# Patient Record
Sex: FEMALE | Employment: PART TIME | URBAN - METROPOLITAN AREA
[De-identification: names, ages, dates, MRNs, and addresses within clinical notes are randomized per-mention and may not be internally consistent; named-entity substitution may affect disease eponyms.]

---

## 2024-09-05 ENCOUNTER — TRANSCRIBE ORDERS (OUTPATIENT)
Dept: ORTHOPEDIC SURGERY | Facility: HOSPITAL | Age: 34
End: 2024-09-05
Payer: COMMERCIAL

## 2024-09-05 DIAGNOSIS — M54.50 LOW BACK PAIN, UNSPECIFIED BACK PAIN LATERALITY, UNSPECIFIED CHRONICITY, UNSPECIFIED WHETHER SCIATICA PRESENT: ICD-10-CM

## 2024-09-06 ENCOUNTER — APPOINTMENT (OUTPATIENT)
Dept: ORTHOPEDIC SURGERY | Facility: CLINIC | Age: 34
End: 2024-09-06
Payer: COMMERCIAL

## 2024-09-06 ENCOUNTER — APPOINTMENT (OUTPATIENT)
Dept: RADIOLOGY | Facility: CLINIC | Age: 34
End: 2024-09-06
Payer: COMMERCIAL

## 2024-09-06 ENCOUNTER — HOSPITAL ENCOUNTER (OUTPATIENT)
Dept: RADIOLOGY | Facility: CLINIC | Age: 34
Discharge: HOME | End: 2024-09-06
Payer: COMMERCIAL

## 2024-09-06 VITALS — BODY MASS INDEX: 33.99 KG/M2 | HEIGHT: 61 IN | WEIGHT: 180 LBS

## 2024-09-06 DIAGNOSIS — M51.36 DEGENERATIVE DISC DISEASE, LUMBAR: ICD-10-CM

## 2024-09-06 DIAGNOSIS — M54.50 LOW BACK PAIN, UNSPECIFIED BACK PAIN LATERALITY, UNSPECIFIED CHRONICITY, UNSPECIFIED WHETHER SCIATICA PRESENT: ICD-10-CM

## 2024-09-06 PROCEDURE — 72110 X-RAY EXAM L-2 SPINE 4/>VWS: CPT

## 2024-09-06 PROCEDURE — 3008F BODY MASS INDEX DOCD: CPT | Performed by: PHYSICIAN ASSISTANT

## 2024-09-06 PROCEDURE — 99203 OFFICE O/P NEW LOW 30 MIN: CPT | Performed by: PHYSICIAN ASSISTANT

## 2024-09-06 RX ORDER — MELOXICAM 7.5 MG/1
7.5 TABLET ORAL DAILY
Qty: 90 TABLET | Refills: 2 | Status: SHIPPED | OUTPATIENT
Start: 2024-09-06

## 2024-09-06 ASSESSMENT — PAIN - FUNCTIONAL ASSESSMENT: PAIN_FUNCTIONAL_ASSESSMENT: 0-10

## 2024-09-06 NOTE — PROGRESS NOTES
HPI:  Ethel Cueva is a 34 y.o. female who presents to the spine clinic for evaluation of chronic low back pain.     Reports midline low back pain with intermittent radiation into the lower extremities. Symptoms ongoing for several years however flare ups have become more frequent and longer lasting.  Pain radiates into bilateral buttocks/hips with radiation below the knees at times. Pain increased with sitting, standing, laying down, and bending/lifting/twisting.     She has previously undergone both chiropractoic treatment and physical therapy, but has not been able to keep up with the exercises at home.     She is not taking anything for pain at this time.     Of note, she is a current smoker.     ROS:  Reviewed on EMR and patient intake sheet.    PMH/SH:  Reviewed on EMR and patient intake sheet.    Exam:  Physical Exam  Spine Musculoskeletal Exam    Well appearing, NAD  Pleasant & cooperative  BMI 34.01  Decreased ROM lumbar spine with rotation, flexion/extension  No paraspinal muscle spasms  lower extremity sensation intact to light touch  lower extremity motor 5/5 throughout  normal gait & station    Radiology:     Xrays lumbar spine done in the office today 09/06/24 personally reviewed. No scoliosis, fractures, or spondylolisthesis. Moderate disc degeneration L4-5. No abnormal motion with flex/ext      Assessment and Plan:  1. Degenerative disc disease, lumbar  - Referral to Physical Therapy; Future  - meloxicam (Mobic) 7.5 mg tablet; Take 1 tablet (7.5 mg) by mouth once daily.  Dispense: 90 tablet; Refill: 2    I reviewed the imaging studies with Ethel Cueva in detail today. Patient with low back pain without claudication or radiculopathy. We discussed the pathology and natural course of degenerative disc disease. I educated the patient on several lifestyle modifications that include increased walking, weight management, smoking cessation, and a routine exercise plan that includes core strengthening. The  patient was educated that this pain may fluctuate over time. I recommended a trial of Meloxicam for pain relief.  Referral placed for physical therapy to focus on core strengthening.       Patient in agreement to plan of care. Of course Ethel Boyceta is welcome to call at anytime with questions or concerns.     Priya Oliveira PA-C  Department of Orthopaedic Surgery    [address] [place] [address]    Voicemail: [phone]   Appts: [phone]  Fax: [phone]

## 2024-09-16 PROBLEM — F41.9 ANXIETY: Status: ACTIVE | Noted: 2020-12-24

## 2024-09-16 PROBLEM — G43.919 INTRACTABLE MIGRAINE WITHOUT STATUS MIGRAINOSUS: Status: ACTIVE | Noted: 2024-09-16

## 2024-09-16 PROBLEM — M54.31 SCIATICA OF RIGHT SIDE: Status: RESOLVED | Noted: 2024-09-16 | Resolved: 2024-09-16

## 2024-09-16 RX ORDER — VENLAFAXINE HYDROCHLORIDE 75 MG/1
1 CAPSULE, EXTENDED RELEASE ORAL DAILY
COMMUNITY
Start: 2020-06-01 | End: 2024-09-17 | Stop reason: WASHOUT

## 2024-09-16 RX ORDER — GABAPENTIN 100 MG/1
2 CAPSULE ORAL 3 TIMES DAILY
COMMUNITY
Start: 2020-06-25 | End: 2024-09-17 | Stop reason: WASHOUT

## 2024-09-16 RX ORDER — TOPIRAMATE 25 MG/1
2 TABLET ORAL 2 TIMES DAILY
COMMUNITY
Start: 2020-06-22 | End: 2024-09-17 | Stop reason: WASHOUT

## 2024-09-16 NOTE — PROGRESS NOTES
Subjective     HPI   Ethel Cuvea is a 34 y.o. year old female patient with presenting to clinic with concern for   Chief Complaint   Patient presents with    New Patient Visit     Was an anxiety medication would like to restart. Was Effexor would like to discuss something else.        Ethel presents to clinic to establish as a new patient. Previously seen by Roxana Vernon PA-C at [facility] in 2021.     Anxiety Effexor 75mg will restart on 37.5mg. Had been on buspar in the past. 5 mg PRN.    Numbness tingly in her feet, worsened with legs crossed. History of sciatica issues. Mom has arthritis since her 20s (unknown if OA vs RA) and niece with juvenile RA.  Back pain has been feeling better, but even with back pain resolution feet paresthesia continues. Father with DM and neuropathy. Pt has concerns.     Bladder issues. Urgency for years.     Migraines- formerly on topamax 50mg, then 100mg    Ortho- Priya Oliveira PA-C   -low back pain with intermittent radicuopathy. Went to chiropractor & PT  On gabapentin in the past. , currently on meloxicam 7.5mg qd    Current smoker     Patient Active Problem List   Diagnosis    Anxiety    Lattice degeneration of retina    Intractable migraine without status migrainosus    Degenerative disc disease, lumbar       History reviewed. No pertinent past medical history.   Past Surgical History:   Procedure Laterality Date    MR ATHROGRAM SHOULDER LEFT W FL GUIDED INJECTION Left 1/7/2016    MR SHOULDER ARTHROGRAM LEFT W FL GUIDED INJECTION [facility]    OTHER SURGICAL HISTORY  06/01/2020    Tonsillectomy    OTHER SURGICAL HISTORY  06/01/2020    Loop electrosurgical excision procedure      Family History   Problem Relation Name Age of Onset    Arthritis Mother      Fibromyalgia Mother      Diabetes Father          father was adopted    Kidney disease Father      Stroke Father      Breast cancer Maternal Grandmother      Lung cancer Maternal Grandmother      Lung cancer Maternal  "Grandfather        Social History     Tobacco Use    Smoking status: Every Day     Current packs/day: 0.50     Average packs/day: 0.5 packs/day for 15.0 years (7.5 ttl pk-yrs)     Types: Cigarettes     Start date: 9/17/2009    Smokeless tobacco: Never   Substance Use Topics    Alcohol use: Yes     Comment: social        Current Outpatient Medications:     gabapentin (Neurontin) 100 mg capsule, Take 2 capsules (200 mg) by mouth 3 times a day., Disp: , Rfl:     meloxicam (Mobic) 7.5 mg tablet, Take 1 tablet (7.5 mg) by mouth once daily., Disp: 90 tablet, Rfl: 2    topiramate (Topamax) 25 mg tablet, Take 2 tablets (50 mg) by mouth 2 times a day., Disp: , Rfl:     venlafaxine XR (Effexor XR) 75 mg 24 hr capsule, Take 1 capsule (75 mg) by mouth once daily., Disp: , Rfl:      Review of Systems  Constitutional: Denies fever  HEENT: Denies ST, earache  CVS: Denies Chest pain  Pulmonary: Denies wheezing, SOB  GI: Denies N/V  : Denies dysuria  Musculoskeletal:  Denies myalgia  Neuro: Denies focal weakness or numbness.  Skin: Denies Rashes.  *Review of Systems is negative unless otherwise mentioned in HPI or ROS above.    Objective   /68   Pulse 88   Temp 35.7 °C (96.2 °F)   Ht 1.549 m (5' 1\")   Wt 81 kg (178 lb 9.6 oz)   SpO2 98%   BMI 33.75 kg/m²  reviewed Body mass index is 33.75 kg/m².     Physical Exam  Constitutional: NAD.  Resting comfortably.  Head: Atraumatic, normocephalic.  ENT: Moist oral mucosa. Nasal mucosa wnl.   Cardiac: Regular rate & rhythm.   Pulmonary: Lungs clear bilat  GI: Soft, Nontender, nondistended.   Musculoskeletal: No peripheral edema.   Skin: No evidence of trauma. No rashes  Psych: Intact judgement and insight.    .Assessment/Plan   Problem List Items Addressed This Visit             ICD-10-CM    Anxiety - Primary F41.9    Relevant Medications    venlafaxine XR (Effexor-XR) 37.5 mg 24 hr capsule    busPIRone (Buspar) 5 mg tablet     Other Visit Diagnoses         Codes    Borderline " hyperlipidemia     E78.5    Relevant Orders    CBC    Comprehensive Metabolic Panel    TSH with reflex to Free T4 if abnormal    Lipid Panel    Vitamin D insufficiency     E55.9    Relevant Orders    Vitamin D 25-Hydroxy,Total (for eval of Vitamin D levels)    Chronic fatigue and malaise     R53.82, R53.81    Relevant Orders    Vitamin B12

## 2024-09-17 ENCOUNTER — APPOINTMENT (OUTPATIENT)
Dept: PRIMARY CARE | Facility: CLINIC | Age: 34
End: 2024-09-17
Payer: COMMERCIAL

## 2024-09-17 ENCOUNTER — EVALUATION (OUTPATIENT)
Dept: PHYSICAL THERAPY | Facility: HOSPITAL | Age: 34
End: 2024-09-17
Payer: COMMERCIAL

## 2024-09-17 VITALS
WEIGHT: 178.6 LBS | HEART RATE: 88 BPM | BODY MASS INDEX: 33.72 KG/M2 | DIASTOLIC BLOOD PRESSURE: 68 MMHG | OXYGEN SATURATION: 98 % | TEMPERATURE: 96.2 F | SYSTOLIC BLOOD PRESSURE: 100 MMHG | HEIGHT: 61 IN

## 2024-09-17 DIAGNOSIS — R53.82 CHRONIC FATIGUE AND MALAISE: ICD-10-CM

## 2024-09-17 DIAGNOSIS — F41.9 ANXIETY: Primary | ICD-10-CM

## 2024-09-17 DIAGNOSIS — M51.36 DEGENERATIVE DISC DISEASE, LUMBAR: Primary | ICD-10-CM

## 2024-09-17 DIAGNOSIS — E55.9 VITAMIN D INSUFFICIENCY: ICD-10-CM

## 2024-09-17 DIAGNOSIS — R53.81 CHRONIC FATIGUE AND MALAISE: ICD-10-CM

## 2024-09-17 DIAGNOSIS — E78.5 BORDERLINE HYPERLIPIDEMIA: ICD-10-CM

## 2024-09-17 DIAGNOSIS — G62.89 OTHER POLYNEUROPATHY: ICD-10-CM

## 2024-09-17 PROBLEM — M51.369 DEGENERATIVE DISC DISEASE, LUMBAR: Status: ACTIVE | Noted: 2024-09-17

## 2024-09-17 PROCEDURE — 97161 PT EVAL LOW COMPLEX 20 MIN: CPT | Mod: GP | Performed by: PHYSICAL THERAPIST

## 2024-09-17 PROCEDURE — 97110 THERAPEUTIC EXERCISES: CPT | Mod: GP | Performed by: PHYSICAL THERAPIST

## 2024-09-17 PROCEDURE — 3008F BODY MASS INDEX DOCD: CPT | Performed by: PHYSICIAN ASSISTANT

## 2024-09-17 PROCEDURE — 99204 OFFICE O/P NEW MOD 45 MIN: CPT | Performed by: PHYSICIAN ASSISTANT

## 2024-09-17 RX ORDER — BUSPIRONE HYDROCHLORIDE 5 MG/1
5 TABLET ORAL DAILY PRN
Qty: 30 TABLET | Refills: 2 | Status: SHIPPED | OUTPATIENT
Start: 2024-09-17 | End: 2024-12-16

## 2024-09-17 RX ORDER — VENLAFAXINE HYDROCHLORIDE 37.5 MG/1
37.5 CAPSULE, EXTENDED RELEASE ORAL DAILY
Qty: 30 CAPSULE | Refills: 1 | Status: SHIPPED | OUTPATIENT
Start: 2024-09-17 | End: 2024-11-16

## 2024-09-17 ASSESSMENT — PATIENT HEALTH QUESTIONNAIRE - PHQ9
1. LITTLE INTEREST OR PLEASURE IN DOING THINGS: NOT AT ALL
2. FEELING DOWN, DEPRESSED OR HOPELESS: NOT AT ALL
1. LITTLE INTEREST OR PLEASURE IN DOING THINGS: NOT AT ALL
SUM OF ALL RESPONSES TO PHQ9 QUESTIONS 1 AND 2: 0
2. FEELING DOWN, DEPRESSED OR HOPELESS: NOT AT ALL
SUM OF ALL RESPONSES TO PHQ9 QUESTIONS 1 AND 2: 0

## 2024-09-17 ASSESSMENT — PAIN DESCRIPTION - DESCRIPTORS: DESCRIPTORS: STABBING;THROBBING

## 2024-09-17 ASSESSMENT — ENCOUNTER SYMPTOMS
LOSS OF SENSATION IN FEET: 0
DEPRESSION: 0
OCCASIONAL FEELINGS OF UNSTEADINESS: 0

## 2024-09-17 ASSESSMENT — COLUMBIA-SUICIDE SEVERITY RATING SCALE - C-SSRS
1. IN THE PAST MONTH, HAVE YOU WISHED YOU WERE DEAD OR WISHED YOU COULD GO TO SLEEP AND NOT WAKE UP?: NO
6. HAVE YOU EVER DONE ANYTHING, STARTED TO DO ANYTHING, OR PREPARED TO DO ANYTHING TO END YOUR LIFE?: NO
2. HAVE YOU ACTUALLY HAD ANY THOUGHTS OF KILLING YOURSELF?: NO

## 2024-09-17 ASSESSMENT — PAIN - FUNCTIONAL ASSESSMENT: PAIN_FUNCTIONAL_ASSESSMENT: 0-10

## 2024-09-17 ASSESSMENT — PAIN SCALES - GENERAL: PAINLEVEL_OUTOF10: 3

## 2024-09-17 NOTE — PROGRESS NOTES
Physical Therapy  Physical Therapy Orthopedic Evaluation and Treatment    Patient Name: Ethel Cueva  MRN: [id number]  Today's Date: 9/17/2024  Time Calculation  Start Time: 0916  Stop Time: 0955  Time Calculation (min): 39 min    PT Evaluation Time Entry  PT Evaluation (Low) Time Entry: 29  PT Therapeutic Procedures Time Entry  Therapeutic Exercise Time Entry: 10                   Insurance:  Visit number: 1 of 5  Authorization info: no auth required  Payor: ANTHEM / Plan: ANTHEM HMP / Product Type: *No Product type* /     Current Problem  Problem List Items Addressed This Visit             ICD-10-CM    Degenerative disc disease, lumbar - Primary M51.36    Relevant Orders    Follow Up In Physical Therapy     1. Degenerative disc disease, lumbar  Referral to Physical Therapy    Follow Up In Physical Therapy          General:  General  Reason for Referral: low back pain  Referred By: Roxanne STAUFFER      Precautions:   Precautions  STEADI Fall Risk Score (The score of 4 or more indicates an increased risk of falling): 4  Medical Precautions: No known precautions/limitation    Medical History Form: Reviewed (scanned into chart)    Subjective:   Subjective   Chief Complaint: Patient is a 34 year old female who presents to clinic with complaints of low back pain. Patient has a prior medical history including: anxiety, right side sciatica.  Onset Date: 9/6/2024  KENAN: Chronic    Current Condition:   Better    Pain:  Pain Assessment: 0-10  0-10 (Numeric) Pain Score: 3  Pain Location: Back  Pain Orientation: Lower  Pain Radiating Towards: right foot  Pain Descriptors: Stabbing, Throbbing  Highest: 10/10 pain  Lowest: 3/10 pain  Aggravating Factors:  twisting  Relieving Factors:  Rest, Heat, and Lying    Relevant Information (PMH & Previous Tests/Imaging):   Lumbar spine xray 9/6/2024:  There are 6 lumbar-type vertebral bodies. For the purposes of counting, the 1st non rib-bearing vertebral body will be designated as L1 and the  inferior most non-fused vertebral body will be designated as S1 as it is thought to be a lumbarized sacral body. Of  note, on the comparison CT, there are 5 sacral bodies identified.      The bilateral SI joints are unremarkable. Visualized portions of the bilateral hip joints are unremarkable.      There is a mild loss of disc space at the L5-S1 level. Otherwise, vertebral body heights and disc spaces are maintained. No spondylolisthesis is noted in the neutral, flexion, or extension positions. There is a relative loss of the expected lumbar lordosis.    Previous Interventions/Treatments: Physical Therapy and Chiropractic    Prior Level of Function (PLOF)  Patient previously independent with all ADLs  Work/School: full time office work  Hobbies: none stated    Patients Living Environment: Reviewed and no concern    Primary Language: English    Patient's Goal(s) for Therapy: Decrease pain.    Red Flags: Do you have any of the following? No  Fever/chills, unexplained weight changes, dizziness/fainting, unexplained change in bowel or bladder functions, unexplained malaise or muscle weakness, night pain/sweats, numbness or tingling    Objective:  Objective     Lumbar AROM  Lumbar flexion: (60°): mod restriction  Lumbar extension (25°): WFL  Lumbar rotation right (30°): WFL  Lumbar rotation left (30°): WFL  Lumbar sidebend right (25°): min restriction  Lumbar sidebend left (25°): WFL    Hip PROM  R hip flexion: (125°): WFL  L hip flexion: (125°): WFL  R hip abduction: (45°): WFL  L hip abduction: (45°): WFL  R hip ER: (45°): WFL  L hip ER: (45°): min restriction  R hip IR: (45°): WFL  L hip IR: (45°): WFL    Specific Lower Extremity MMT  R Iliopsoas: (5/5): 5/5  L Iliopsoas: (5/5): 5/5  R Gluteals (sidelying): (5/5): 5/5  L Gluteals (sidelying): (5/5): 5/5    Knee AROM  Knee AROM WFL: yes    Knee MMT  R knee flexion: (5/5): 5/5  L knee flexion: (5/5): 5/5  R knee extension: (5/5): 5/5  L knee extension: (5/5):  "5/5    Ankle AROM  Ankle AROM WFL: yes    Ankle MMT  R ankle dorsiflexion: (5/5): 5/5  L ankle dorsiflexion: (5/5): 5/5  R ankle plantarflexion: (5/5): 5/5  L ankle plantarflexion: (5/5): 5/5      Functional Screening    Lower Extremity Functional Movements  Transfers: Independent  Gait: none  Assistive Device: no device  DL Squat: WFL  SL Squat: not tested     Balance  Feet Together: WFL   Tandem: 10 sec  SLS: 10 sec      Special Tests    Response to repeated L/S movements for directional preference: -  Leg Length Discrepancy: -  SI Alignment: WFL  Hip Scouring: -  SI Compression Test: -  SI Distraction Test: -  90-90 SLR Test: -  Kamran Test: -  JOBY Test: +  Slump Test: -    Treatment Performed:  Therapeutic Exercise  Therapeutic Exercise Performed: Yes  Therapeutic Exercise Activity 1: h/l hip add iso x5  Therapeutic Exercise Activity 2: LTR x5  Therapeutic Exercise Activity 3: piriformis stretch 1x15\"  Therapeutic Exercise Activity 4: bridge with abd yellow x5  Therapeutic Exercise Activity 5: hamstring stretch 1x15\"    Outcome Measures:  Other Measures  5x Sit to Stand: 14.29 seconds  Oswestry Disablity Index (ANA): 46%     EDUCATION:   Individual(s) Educated: patient   Education Provided: Home exercise program, plan of care, activity modifications, pain management, and injury pathology  Handout(s) Provided: Scanned into chart  Home Program: Access Code: [email or web address]  Risk and Benefits Discussed with Patient/Caregiver/Other: Yes   Patient/Caregiver Demonstrated Understanding: Yes   Plan of Care Discussed and Agreed Upon: Yes   Patient Response to Education: Patient/Caregiver verbalized understanding of information and Patient/Caregiver performed return demonstration of exercises/activities    Assessment: Patient presents with impaired range of motion/flexibility resulting in limited participation in pain-free ADLs and inability to perform at their prior level of function. Skilled PT warranted to address the above " stated impairments, so the patient can perform FA's without increased pain or difficulty.    PT Assessment Results: Decreased range of motion, Pain  Rehab Prognosis: Good  Evaluation/Treatment Tolerance: Patient tolerated treatment well    Complexity: low    Plan:  Treatment/Interventions: Education/ Instruction, Gait training, Manual therapy, Neuromuscular re-education, Therapeutic activities, Therapeutic exercises  PT Plan: Skilled PT  PT Frequency: 1 time per week  Duration: 5 weeks  Onset Date: 09/06/24  Certification Period Start Date: 09/17/24  Certification Period End Date: 10/22/24  Number of Treatments Authorized: 1 of 5  Rehab Potential: Good  Plan of Care Agreement: Patient    Goals: Set and discussed today  Active       PT Problem       Patient will achieve spinal flexion to WFL.       Start:  09/17/24    Expected End:  10/22/24            Patient will achieve right spinal side bending ROM WFL.       Start:  09/17/24    Expected End:  10/22/24            Patient will demonstrate independence in home program for support of progression       Start:  09/17/24    Expected End:  10/22/24            Patient will report pain of <2/10 demonstrating a reduction of overall pain       Start:  09/17/24    Expected End:  10/22/24            Patient will be able to participate fully in a full day of work without increased low back pain.       Start:  09/17/24    Expected End:  10/22/24            Patient will show a significant change in ANA (46% to 34%) patient reported outcome tool to demonstrate subjective imporovement       Start:  09/17/24    Expected End:  10/22/24            Patient will perform 5 Times Sit to  <11 seconds.       Start:  09/17/24    Expected End:  10/22/24                    Plan of care was developed with input and agreement by the patient    Ambulatory Screenings Summary       Screening  Frequency  Date Last Completed   Spiritual and Cultural Beliefs   Screening each visit or episode  of care    Falls Risk Screening every ambulatory visit 9/17/2024    Pain Screening annually at primary care visit     Domestic Violence screening annually at primary care visit    Depression Screening annually in the primary care setting    Suicide Risk Screening annually in the primary care setting    Nutrition and Food Insecurity   Screening at least annually at primary care visit     Key Learner annually in the primary care setting    Drug Screen     Alcohol Screen     Advance Directive         Gigi Perez, PT

## 2024-09-19 ENCOUNTER — LAB (OUTPATIENT)
Dept: LAB | Facility: LAB | Age: 34
End: 2024-09-19
Payer: COMMERCIAL

## 2024-09-19 DIAGNOSIS — E78.5 BORDERLINE HYPERLIPIDEMIA: ICD-10-CM

## 2024-09-19 DIAGNOSIS — G62.89 OTHER POLYNEUROPATHY: ICD-10-CM

## 2024-09-19 DIAGNOSIS — R53.82 CHRONIC FATIGUE AND MALAISE: ICD-10-CM

## 2024-09-19 DIAGNOSIS — E55.9 VITAMIN D INSUFFICIENCY: ICD-10-CM

## 2024-09-19 DIAGNOSIS — R53.81 CHRONIC FATIGUE AND MALAISE: ICD-10-CM

## 2024-09-19 LAB
25(OH)D3 SERPL-MCNC: 30 NG/ML (ref 30–100)
ALBUMIN SERPL BCP-MCNC: 4.3 G/DL (ref 3.4–5)
ALP SERPL-CCNC: 67 U/L (ref 33–110)
ALT SERPL W P-5'-P-CCNC: 17 U/L (ref 7–45)
ANION GAP SERPL CALC-SCNC: 12 MMOL/L (ref 10–20)
AST SERPL W P-5'-P-CCNC: 15 U/L (ref 9–39)
BILIRUB SERPL-MCNC: 0.4 MG/DL (ref 0–1.2)
BUN SERPL-MCNC: 9 MG/DL (ref 6–23)
CALCIUM SERPL-MCNC: 9.2 MG/DL (ref 8.6–10.3)
CENTROMERE B AB SER-ACNC: <0.2 AI
CHLORIDE SERPL-SCNC: 105 MMOL/L (ref 98–107)
CHOLEST SERPL-MCNC: 143 MG/DL (ref 0–199)
CHOLESTEROL/HDL RATIO: 2.4
CHROMATIN AB SERPL-ACNC: <0.2 AI
CO2 SERPL-SCNC: 26 MMOL/L (ref 21–32)
CREAT SERPL-MCNC: 0.59 MG/DL (ref 0.5–1.05)
DSDNA AB SER-ACNC: <1 IU/ML
EGFRCR SERPLBLD CKD-EPI 2021: >90 ML/MIN/1.73M*2
ENA JO1 AB SER QL IA: <0.2 AI
ENA RNP AB SER IA-ACNC: <0.2 AI
ENA SCL70 AB SER QL IA: <0.2 AI
ENA SM AB SER IA-ACNC: <0.2 AI
ENA SM+RNP AB SER QL IA: <0.2 AI
ENA SS-A AB SER IA-ACNC: <0.2 AI
ENA SS-B AB SER IA-ACNC: <0.2 AI
ERYTHROCYTE [DISTWIDTH] IN BLOOD BY AUTOMATED COUNT: 13.4 % (ref 11.5–14.5)
GLUCOSE SERPL-MCNC: 91 MG/DL (ref 74–99)
HCT VFR BLD AUTO: 41 % (ref 36–46)
HDLC SERPL-MCNC: 59.3 MG/DL
HGB BLD-MCNC: 13.4 G/DL (ref 12–16)
LDLC SERPL CALC-MCNC: 74 MG/DL
MCH RBC QN AUTO: 30.9 PG (ref 26–34)
MCHC RBC AUTO-ENTMCNC: 32.7 G/DL (ref 32–36)
MCV RBC AUTO: 95 FL (ref 80–100)
NON HDL CHOLESTEROL: 84 MG/DL (ref 0–149)
NRBC BLD-RTO: 0 /100 WBCS (ref 0–0)
PLATELET # BLD AUTO: 320 X10*3/UL (ref 150–450)
POTASSIUM SERPL-SCNC: 4.5 MMOL/L (ref 3.5–5.3)
PROT SERPL-MCNC: 6.9 G/DL (ref 6.4–8.2)
RBC # BLD AUTO: 4.34 X10*6/UL (ref 4–5.2)
RIBOSOMAL P AB SER-ACNC: <0.2 AI
SODIUM SERPL-SCNC: 138 MMOL/L (ref 136–145)
TREPONEMA PALLIDUM IGG+IGM AB [PRESENCE] IN SERUM OR PLASMA BY IMMUNOASSAY: NONREACTIVE
TRIGL SERPL-MCNC: 48 MG/DL (ref 0–149)
TSH SERPL-ACNC: 1.31 MIU/L (ref 0.44–3.98)
VIT B12 SERPL-MCNC: 295 PG/ML (ref 211–911)
VLDL: 10 MG/DL (ref 0–40)
WBC # BLD AUTO: 8.2 X10*3/UL (ref 4.4–11.3)

## 2024-09-19 PROCEDURE — 82306 VITAMIN D 25 HYDROXY: CPT

## 2024-09-19 PROCEDURE — 82607 VITAMIN B-12: CPT

## 2024-09-19 PROCEDURE — 86038 ANTINUCLEAR ANTIBODIES: CPT

## 2024-09-19 PROCEDURE — 86780 TREPONEMA PALLIDUM: CPT

## 2024-09-19 PROCEDURE — 36415 COLL VENOUS BLD VENIPUNCTURE: CPT

## 2024-09-19 PROCEDURE — 86235 NUCLEAR ANTIGEN ANTIBODY: CPT

## 2024-09-19 PROCEDURE — 86225 DNA ANTIBODY NATIVE: CPT

## 2024-09-20 LAB — ANA SER QL HEP2 SUBST: NEGATIVE

## 2024-09-24 ENCOUNTER — TREATMENT (OUTPATIENT)
Dept: PHYSICAL THERAPY | Facility: HOSPITAL | Age: 34
End: 2024-09-24
Payer: COMMERCIAL

## 2024-09-24 DIAGNOSIS — M51.36 DEGENERATIVE DISC DISEASE, LUMBAR: ICD-10-CM

## 2024-09-24 PROCEDURE — 97110 THERAPEUTIC EXERCISES: CPT | Mod: GP,CQ

## 2024-09-24 ASSESSMENT — PAIN SCALES - GENERAL: PAINLEVEL_OUTOF10: 4

## 2024-09-24 ASSESSMENT — PAIN - FUNCTIONAL ASSESSMENT: PAIN_FUNCTIONAL_ASSESSMENT: 0-10

## 2024-09-24 NOTE — PROGRESS NOTES
Physical Therapy Treatment    Patient Name: Ethel Cueva  MRN: [id number]  Today's Date: 9/24/2024  Time Calculation  Start Time: 1010  Stop Time: 1048  Time Calculation (min): 38 min        PT Therapeutic Procedures Time Entry  Therapeutic Exercise Time Entry: 38                Current Problem  1. 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    Performing HEP?: Partially    Precautions  Precautions  STEADI Fall Risk Score (The score of 4 or more indicates an increased risk of falling): 4  Medical Precautions: No known precautions/limitation    Pain  Pain Assessment: 0-10  0-10 (Numeric) Pain Score: 4  Pain Type: Chronic pain  Pain Location: Back  Pain Orientation: Lower  Pain Radiating Towards: R foot  Pain Descriptors: Burning, Aching, Spasm, Stabbing, Throbbing  Pain Frequency: Constant/continuous    Objective   Lumbar Spine  Observation   Pt. Presents with tight HS, gastrocs and LB  Lumbar Palpation/Joint Mobility Assessment   Pain with palpation B SI joints, and surrounding musculature  Lumbar AROM   Limited in rotation, flex, ext.  Treatments:    Therapeutic Exercise  Therapeutic Exercise Performed: Yes  Therapeutic Exercise Activity 1: h/l hip add iso x5  Therapeutic Exercise Activity 2: LTR x5  Therapeutic Exercise Activity 3: piriformis stretch 20 sec. x 2 ea.  Therapeutic Exercise Activity 4: 2 of 5  Therapeutic Exercise Activity 5: prone on elbows  Therapeutic Exercise Activity 6: seated stepper x 6 min. Lv 5  Therapeutic Exercise Activity 7: gastroc/ HS stretch 20 sec. x 3    EDUCATION:   Outpatient Education  Individual(s) Educated: Patient  Education Provided: Home Exercise Program  Patient/Caregiver Demonstrated Understanding:  yes    Assessment: Pt. Able to tolerate all exercises this day, and reported her pain had lessened after exercising. Pt. Given updated HEP with demonstration of each exercises.  PT Assessment  PT Assessment Results: Decreased range of motion, Pain  Rehab Prognosis: Good  Evaluation/Treatment Tolerance: Patient tolerated treatment well    Plan:continue with current PT POC with focus on strengthening of core and Les and stretching tight surrounding musculature.  OP PT Plan  Treatment/Interventions: Education/ Instruction  PT Plan: Skilled PT  PT Frequency: 1 time per week  Duration: 5 weeks  Onset Date: 09/06/24  Certification Period Start Date: 09/17/24  Certification Period End Date: 10/22/24  Number of Treatments Authorized: 2 of 5  Rehab Potential: Good  Plan of Care Agreement: Patient    Goals:  Active       PT Problem       Patient will achieve spinal flexion to WFL.       Start:  09/17/24    Expected End:  10/22/24            Patient will achieve right spinal side bending ROM WFL.       Start:  09/17/24    Expected End:  10/22/24            Patient will demonstrate independence in home program for support of progression       Start:  09/17/24    Expected End:  10/22/24            Patient will report pain of <2/10 demonstrating a reduction of overall pain       Start:  09/17/24    Expected End:  10/22/24            Patient will be able to participate fully in a full day of work without increased low back pain.       Start:  09/17/24    Expected End:  10/22/24            Patient will show a significant change in ANA (46% to 34%) patient reported outcome tool to demonstrate subjective imporovement       Start:  09/17/24    Expected End:  10/22/24            Patient will perform 5 Times Sit to  <11 seconds.       Start:  09/17/24    Expected End:  10/22/24                     Glo Harrington PTA

## 2024-10-01 ENCOUNTER — TREATMENT (OUTPATIENT)
Dept: PHYSICAL THERAPY | Facility: HOSPITAL | Age: 34
End: 2024-10-01
Payer: COMMERCIAL

## 2024-10-01 ENCOUNTER — APPOINTMENT (OUTPATIENT)
Dept: PRIMARY CARE | Facility: CLINIC | Age: 34
End: 2024-10-01
Payer: COMMERCIAL

## 2024-10-01 DIAGNOSIS — M51.369 DEGENERATIVE DISC DISEASE, LUMBAR: ICD-10-CM

## 2024-10-01 PROCEDURE — 97140 MANUAL THERAPY 1/> REGIONS: CPT | Mod: GP,CQ

## 2024-10-01 PROCEDURE — 97110 THERAPEUTIC EXERCISES: CPT | Mod: GP,CQ

## 2024-10-01 ASSESSMENT — PAIN SCALES - GENERAL: PAINLEVEL_OUTOF10: 3

## 2024-10-01 ASSESSMENT — PAIN - FUNCTIONAL ASSESSMENT: PAIN_FUNCTIONAL_ASSESSMENT: 0-10

## 2024-10-01 NOTE — PROGRESS NOTES
Physical Therapy Treatment    Patient Name: Ethel Cueva  MRN: [id number]  Today's Date: 10/1/2024  Time Calculation  Start Time: 1350  Stop Time: 1435  Time Calculation (min): 45 min        PT Therapeutic Procedures Time Entry  Manual Therapy Time Entry: 10  Therapeutic Exercise Time Entry: 35                Current Problem  1. Degenerative disc disease, lumbar  Follow Up In Physical Therapy          General  Reason for Referral: low back pain  Referred By: Roxanne STAUFFER    Subjective   Current Condition:   Better  Patient reports pt reports she is feeling a little better, because she states her pain is not continuous anymore    Performing HEP?: Yes    Precautions  Precautions  STEADI Fall Risk Score (The score of 4 or more indicates an increased risk of falling): 4  Medical Precautions: No known precautions/limitation    Pain  Pain Assessment: 0-10  0-10 (Numeric) Pain Score: 3  Pain Type: Chronic pain  Pain Location: Back  Pain Orientation: Lower  Pain Radiating Towards: thigh to knee R  Pain Descriptors: Burning, Aching, Spasm, Stabbing, Throbbing  Pain Frequency: Intermittent    Objective   Lumbar Spine  Observation   Pain gone after manual therapy in L paraspinals  Lumbar Palpation/Joint Mobility Assessment   L upper back paraspinals painful when palpated.  Lumbar AROM   Limited in rotation > to R  Treatments:    Therapeutic Exercise  Therapeutic Exercise Performed: Yes  Therapeutic Exercise Activity 1: h/l hip add iso x10  Therapeutic Exercise Activity 2: LTR x10  Therapeutic Exercise Activity 3: piriformis stretch 20 sec. x 2 ea.  Therapeutic Exercise Activity 4: bridge with abd red x 10  Therapeutic Exercise Activity 5: prone on elbows x 1 min.  Therapeutic Exercise Activity 6: seated stepper x 6 min. Lv 5  Therapeutic Exercise Activity 7: gastroc/ HS stretch 20 sec. x 3  Therapeutic Exercise Activity 8: step ups 4 inch x 10 ea.  Therapeutic Exercise Activity 9: QS with SLR supine  Therapeutic Exercise  Activity 10: prone on hands x 5    EDUCATION:   Outpatient Education  Individual(s) Educated: Patient  Education Provided: Home Exercise Program  Patient/Caregiver Demonstrated Understanding: yes    Assessment:Pt. Able to tolerate all exercises and reported less pain at conclusion  PT Assessment  PT Assessment Results: Decreased range of motion, Pain  Rehab Prognosis: Good  Evaluation/Treatment Tolerance: Patient tolerated treatment well    Plan:continue with PT POC with focus on stretching tight musculature, strengthening LES and core, and manual for pain control  OP PT Plan  Treatment/Interventions: Education/ Instruction  PT Plan: Skilled PT  PT Frequency: 1 time per week  Duration: 5 weeks  Onset Date: 09/06/24  Certification Period Start Date: 09/17/24  Certification Period End Date: 10/22/24  Number of Treatments Authorized: 3 of 5  Rehab Potential: Good  Plan of Care Agreement: Patient    Goals:  Active       PT Problem       Patient will achieve spinal flexion to WFL.       Start:  09/17/24    Expected End:  10/22/24            Patient will achieve right spinal side bending ROM WFL.       Start:  09/17/24    Expected End:  10/22/24            Patient will demonstrate independence in home program for support of progression       Start:  09/17/24    Expected End:  10/22/24            Patient will report pain of <2/10 demonstrating a reduction of overall pain       Start:  09/17/24    Expected End:  10/22/24            Patient will be able to participate fully in a full day of work without increased low back pain.       Start:  09/17/24    Expected End:  10/22/24            Patient will show a significant change in ANA (46% to 34%) patient reported outcome tool to demonstrate subjective imporovement       Start:  09/17/24    Expected End:  10/22/24            Patient will perform 5 Times Sit to  <11 seconds.       Start:  09/17/24    Expected End:  10/22/24                     Glo Harrington, PTA

## 2024-10-11 ENCOUNTER — APPOINTMENT (OUTPATIENT)
Dept: PHYSICAL THERAPY | Facility: HOSPITAL | Age: 34
End: 2024-10-11
Payer: COMMERCIAL

## 2024-12-09 ENCOUNTER — HOSPITAL ENCOUNTER (OUTPATIENT)
Dept: RADIOLOGY | Facility: CLINIC | Age: 34
Discharge: HOME | End: 2024-12-09
Payer: COMMERCIAL

## 2024-12-09 ENCOUNTER — OFFICE VISIT (OUTPATIENT)
Dept: URGENT CARE | Facility: URGENT CARE | Age: 34
End: 2024-12-09
Payer: COMMERCIAL

## 2024-12-09 VITALS
DIASTOLIC BLOOD PRESSURE: 80 MMHG | SYSTOLIC BLOOD PRESSURE: 122 MMHG | RESPIRATION RATE: 18 BRPM | TEMPERATURE: 98.8 F | OXYGEN SATURATION: 98 % | WEIGHT: 174.16 LBS | BODY MASS INDEX: 32.91 KG/M2 | HEART RATE: 88 BPM

## 2024-12-09 DIAGNOSIS — J15.9 COMMUNITY ACQUIRED BACTERIAL PNEUMONIA: ICD-10-CM

## 2024-12-09 DIAGNOSIS — J98.01 BRONCHOSPASM: Primary | ICD-10-CM

## 2024-12-09 DIAGNOSIS — J98.01 BRONCHOSPASM: ICD-10-CM

## 2024-12-09 DIAGNOSIS — R06.02 SHORTNESS OF BREATH: ICD-10-CM

## 2024-12-09 DIAGNOSIS — J01.00 ACUTE NON-RECURRENT MAXILLARY SINUSITIS: ICD-10-CM

## 2024-12-09 PROCEDURE — 71046 X-RAY EXAM CHEST 2 VIEWS: CPT

## 2024-12-09 PROCEDURE — 71046 X-RAY EXAM CHEST 2 VIEWS: CPT | Performed by: RADIOLOGY

## 2024-12-09 PROCEDURE — 99204 OFFICE O/P NEW MOD 45 MIN: CPT | Performed by: PHYSICIAN ASSISTANT

## 2024-12-09 PROCEDURE — 94640 AIRWAY INHALATION TREATMENT: CPT | Performed by: PHYSICIAN ASSISTANT

## 2024-12-09 RX ORDER — ALBUTEROL SULFATE 0.83 MG/ML
2.5 SOLUTION RESPIRATORY (INHALATION) ONCE
Status: COMPLETED | OUTPATIENT
Start: 2024-12-09 | End: 2024-12-09

## 2024-12-09 RX ORDER — INHALER, ASSIST DEVICES
SPACER (EA) MISCELLANEOUS
Qty: 1 EACH | Refills: 0 | Status: SHIPPED | OUTPATIENT
Start: 2024-12-09 | End: 2025-12-09

## 2024-12-09 RX ORDER — CEFDINIR 300 MG/1
300 CAPSULE ORAL 2 TIMES DAILY
Qty: 20 CAPSULE | Refills: 0 | Status: SHIPPED | OUTPATIENT
Start: 2024-12-09 | End: 2024-12-19

## 2024-12-09 RX ORDER — AZITHROMYCIN 250 MG/1
TABLET, FILM COATED ORAL
Qty: 6 TABLET | Refills: 0 | Status: SHIPPED | OUTPATIENT
Start: 2024-12-09

## 2024-12-09 RX ORDER — ALBUTEROL SULFATE 90 UG/1
2 INHALANT RESPIRATORY (INHALATION) EVERY 6 HOURS PRN
Qty: 8.5 G | Refills: 0 | Status: SHIPPED | OUTPATIENT
Start: 2024-12-09 | End: 2025-12-09

## 2024-12-09 NOTE — PROGRESS NOTES
Subjective   Patient ID: Ethel Cueva is a 34 y.o. female. They present today with a chief complaint of Cough (4 days ), Fever (6 days AT 11:30 this morning ), Generalized Body Aches (6 days ), Nausea (6 days), Vomiting (6 days ), and Headache (6 days ).    History of Present Illness  HPI  Pt presents with . She reports cough productive worse with lying down, mild wheeze and dyspnea with over exertion. Fever up to 101 with chills and sweats all day and night. Pt takes meds and symptoms improve temporarily. Pt did not do home covid test. Pt is a smoker. No hx of asthma. Pt used inhaler as child.       Past Medical History  Allergies as of 12/09/2024 - Reviewed 12/09/2024   Allergen Reaction Noted    Penicillins Rash and Hives 1990       (Not in a hospital admission)       No past medical history on file.    Past Surgical History:   Procedure Laterality Date    MR ATHROGRAM SHOULDER LEFT W FL GUIDED INJECTION Left 1/7/2016    MR SHOULDER ARTHROGRAM LEFT W FL GUIDED INJECTION [facility]    OTHER SURGICAL HISTORY  06/01/2020    Tonsillectomy    OTHER SURGICAL HISTORY  06/01/2020    Loop electrosurgical excision procedure        reports that she has been smoking cigarettes. She started smoking about 15 years ago. She has a 7.6 pack-year smoking history. She has never used smokeless tobacco. She reports current alcohol use. She reports that she does not use drugs.    Review of Systems  Review of Systems                               Objective    Vitals:    12/09/24 1547 12/09/24 1647   BP: 122/80    BP Location: Left arm    Patient Position: Sitting    Pulse: 82 88   Resp: 18    Temp: 37.1 °C (98.8 °F)    TempSrc: Oral    SpO2: 94% 98%   Weight: 79 kg (174 lb 2.6 oz)      Patient's last menstrual period was 12/06/2024 (exact date).    Physical Exam  Vitals and nursing note reviewed.   Constitutional:       Appearance: Normal appearance.      Comments: Pleasant white female, obese body habitus   HENT:       Head: Normocephalic and atraumatic.      Right Ear: Tympanic membrane and ear canal normal.      Left Ear: Tympanic membrane and ear canal normal.      Nose: Congestion and rhinorrhea present.      Mouth/Throat:      Mouth: Mucous membranes are moist.      Pharynx: Posterior oropharyngeal erythema (with cobbling) present.   Eyes:      Extraocular Movements: Extraocular movements intact.      Conjunctiva/sclera: Conjunctivae normal.      Pupils: Pupils are equal, round, and reactive to light.   Cardiovascular:      Rate and Rhythm: Regular rhythm.      Heart sounds: No murmur heard.  Pulmonary:      Effort: Pulmonary effort is normal. No respiratory distress.      Comments: Diminished breath sounds bilaterally with crackles at bases, preneb 94%, post neb 98%. Harsh cough spitting out mucus into sink  Musculoskeletal:         General: Normal range of motion.      Cervical back: Normal range of motion and neck supple.   Lymphadenopathy:      Cervical: No cervical adenopathy.   Skin:     General: Skin is warm and dry.   Neurological:      General: No focal deficit present.      Mental Status: She is alert.         Procedures    Point of Care Test & Imaging Results from this visit  No results found for this visit on 12/09/24.   XR chest 2 views    Result Date: 12/9/2024  Interpreted By:  Amadeo Leary, STUDY: XR CHEST 2 VIEWS; 12/9/2024 4:34 pm   INDICATION: Signs/Symptoms:productive cough, wheeze, dyspnea   COMPARISON: None.   ACCESSION NUMBER(S): [id number]   ORDERING CLINICIAN: ADONIS BARTH   TECHNIQUE: Number of films: Two-view radiographs of the chest were obtained.   FINDINGS: The heart and mediastinum are normal. Patchy infiltrates are present in the right mid and lower lung, mostly in the right upper and right middle lobes. No pleural effusions are seen. The osseous structures are unremarkable.       Right mid and lower lung pneumonia. Follow-up in 2 weeks to document resolution.   Signed by:  Amadeo Leary 12/9/2024 5:00 PM Dictation workstation:   [id number]     Diagnostic study results (if any) were reviewed by Amy Curry PA-C.    Assessment/Plan   Allergies, medications, history, and pertinent labs/EKGs/Imaging reviewed by Amy Curry PA-C.     Medical Decision Making  35 yo F presents with cough productive worse with lying down, mild wheeze and dyspnea with over exertion x 6 days.  Fever up to 101 with chills and sweats all day and night, nausea, body aches, headache. Reviewed vitals stable. Exam remarkable for sinus congestion, rhinorrhea, pharyngeal erythema, diminished breath sounds diffusely, crackles at bases, harsh cough producing sputum.    Discussed Community Acquired Pneumonia on right- Reviewed chest xray with right mid and lower lung pneumonia with recommendation to repeat imaging in 2 weeks. Advised to Start Azithromycin 2 tabs day 1, 1 tab day 2-5; Start Omnicef twice a day x 10 days; take with food and probiotic. Start OTC expectorant such as Robitussin or Mucinex with plenty of fluids. Hydration with Pedialyte or Liquid IV OTC.     Bronchospasm with concern for COPD vs reactive airway- Albuterol neb trialed, Preneb O2 94%, Post neb O2 98%.  Start Albuterol inhaler 2 puffs every 6hr as needed for cough or wheeze. Smoking cessation advised. GO to ER if difficulty breathing or chest pain or dizziness.    Acute sinusitis- above antibiotics will cover for infection. Trial of flonase nasal spray 1 spray each nostril twice a day x 3-7 days, Daily Zyrtec 10mg.    Orders and Diagnoses  Diagnoses and all orders for this visit:  Bronchospasm  -     XR chest 2 views; Future  -     albuterol 2.5 mg /3 mL (0.083 %) nebulizer solution 2.5 mg  -     albuterol (ProAir HFA) 90 mcg/actuation inhaler; Inhale 2 puffs every 6 hours if needed for wheezing or shortness of breath.  -     inhalational spacing device (BreatheRite MDI Spacer) inhaler; Use as instructed with albuterol  inhaler  Acute non-recurrent maxillary sinusitis  -     azithromycin (Zithromax) 250 mg tablet; Take 2 tabs (500 mg) by mouth today, then take 1 tab daily for 4 days.  Community acquired bacterial pneumonia  -     XR chest 2 views; Future  -     cefdinir (Omnicef) 300 mg capsule; Take 1 capsule (300 mg) by mouth 2 times a day for 10 days.      Medical Admin Record  Administrations This Visit       albuterol 2.5 mg /3 mL (0.083 %) nebulizer solution 2.5 mg       Admin Date  12/09/2024 Action  Given Dose  2.5 mg Route  nebulization Documented By  Nelli Interiano MA                    Patient disposition: Home    Electronically signed by Amy Curry PA-C  7:29 PM

## 2024-12-09 NOTE — PATIENT INSTRUCTIONS
Community Acquired Pneumonia on right- Start Azithromycin 2 tabs day 1, 1 tab day 2-5; Start Omnicef twice a day x 10 days; take with food and probiotic. Start OTC expectorant such as Robitussin or Mucinex with plenty of fluids. Hydration with Pedialyte or Liquid IV OTC.     Bronchospasm- Albuterol neb trialed, Preneb O2 94%, Post neb O2.  Start Albuterol inhaler 2 puffs every 6hr as needed for cough or wheeze. Smoking cessation advised. GO to ER if difficulty breathing or chest pain or dizziness.    Acute sinusitis- above antibiotics will cover for infection. Trial of flonase nasal spray 1 spray each nostril twice a day x 3-7 days, Daily Zyrtec 10mg.

## 2025-01-03 ENCOUNTER — DOCUMENTATION (OUTPATIENT)
Dept: PHYSICAL THERAPY | Facility: HOSPITAL | Age: 35
End: 2025-01-03
Payer: COMMERCIAL

## 2025-01-03 NOTE — PROGRESS NOTES
Physical Therapy    Discharge Summary    Name: Ethel Cueva  MRN: [id number]  : 1990  Date: 25    Discharge Summary: PT    Discharge Information: Date of discharge 1/3/2025, Date of last visit 10/1/2024, Date of evaluation 2024, Number of attended visits 3, Referred by Roxanne STAUFFER, and Referred for degenerative disc disease, lumbar    Therapy Summary: Patient is a 34 year old female who presents to clinic with complaints of low back pain. Patient has a prior medical history including: anxiety, right side sciatica.     Discharge Status: Pt. Able to tolerate all exercises and reported less pain at conclusion      Rehab Discharge Reason: Failed to schedule and/or keep follow-up appointment(s)

## 2025-03-18 ENCOUNTER — APPOINTMENT (OUTPATIENT)
Dept: PRIMARY CARE | Facility: CLINIC | Age: 35
End: 2025-03-18
Payer: COMMERCIAL

## 2025-03-24 ASSESSMENT — PROMIS GLOBAL HEALTH SCALE
RATE_MENTAL_HEALTH: GOOD
RATE_GENERAL_HEALTH: GOOD
RATE_PHYSICAL_HEALTH: GOOD
CARRYOUT_SOCIAL_ACTIVITIES: VERY GOOD
EMOTIONAL_PROBLEMS: RARELY
RATE_AVERAGE_PAIN: 5
RATE_QUALITY_OF_LIFE: GOOD
RATE_SOCIAL_SATISFACTION: VERY GOOD
CARRYOUT_PHYSICAL_ACTIVITIES: MOSTLY
RATE_AVERAGE_FATIGUE: MODERATE

## 2025-03-24 NOTE — PROGRESS NOTES
Subjective     HPI   Ethel Cueva is a 34 y.o. year old female patient with presenting to clinic with concern for   Chief Complaint   Patient presents with    Annual Exam    Medication Reaction     Venlafaxine - would like to try something else       BP Readings from Last 5 Encounters:   03/25/25 126/68   12/09/24 122/80   09/17/24 100/68   03/30/22 127/82   08/03/20 130/72        Anxiety  -effexor caused fatigue and URI symptoms. Has been off for months. Zoloft as a young teen  -start lexapro      Bladder issues. Urgency for years.   Recommend pelvic floor therapy  Can try oxybutynin     Migraines   Doing well without meds  -ok with ibuprofen    Low back pain with intermittent radicuopathy  Ortho- Priya Oliveira PA-C   -meloxicam 7.5mg qd  -chiropractor & PT  On gabapentin in the past ,     Patient Active Problem List   Diagnosis    Anxiety    Lattice degeneration of retina    Intractable migraine without status migrainosus    Degenerative disc disease, lumbar       History reviewed. No pertinent past medical history.   Past Surgical History:   Procedure Laterality Date    MR ATHROGRAM SHOULDER LEFT W FL GUIDED INJECTION Left 1/7/2016    MR SHOULDER ARTHROGRAM LEFT W FL GUIDED INJECTION [facility]    OTHER SURGICAL HISTORY  06/01/2020    Tonsillectomy    OTHER SURGICAL HISTORY  06/01/2020    Loop electrosurgical excision procedure      Family History   Problem Relation Name Age of Onset    Arthritis Mother      Fibromyalgia Mother      Diabetes Father          father was adopted    Kidney disease Father      Stroke Father      Breast cancer Maternal Grandmother      Lung cancer Maternal Grandmother      Lung cancer Maternal Grandfather        Social History     Tobacco Use    Smoking status: Every Day     Current packs/day: 0.50     Average packs/day: 0.5 packs/day for 15.5 years (7.8 ttl pk-yrs)     Types: Cigarettes     Start date: 9/17/2009    Smokeless tobacco: Never   Substance Use Topics    Alcohol  "use: Yes     Comment: social        Current Outpatient Medications:     albuterol (ProAir HFA) 90 mcg/actuation inhaler, Inhale 2 puffs every 6 hours if needed for wheezing or shortness of breath., Disp: 8.5 g, Rfl: 0    busPIRone (Buspar) 5 mg tablet, Take 1 tablet (5 mg) by mouth once daily as needed (anxiety). (Patient not taking: Reported on 12/9/2024), Disp: 30 tablet, Rfl: 2    escitalopram (Lexapro) 10 mg tablet, Take 1 tablet (10 mg) by mouth once daily., Disp: 90 tablet, Rfl: 3    inhalational spacing device (BreatheRite MDI Spacer) inhaler, Use as instructed with albuterol inhaler, Disp: 1 each, Rfl: 0    meloxicam (Mobic) 7.5 mg tablet, Take 1 tablet (7.5 mg) by mouth once daily. (Patient not taking: Reported on 12/9/2024), Disp: 90 tablet, Rfl: 2    oxybutynin (Ditropan) 5 mg tablet, Take 1 tablet (5 mg) by mouth once daily., Disp: 30 tablet, Rfl: 0     Review of Systems  Constitutional: Denies fever  HEENT: Denies ST, earache  CVS: Denies Chest pain  Pulmonary: Denies wheezing, SOB  GI: Denies N/V  : Denies dysuria  Musculoskeletal:  Denies myalgia  Neuro: Denies focal weakness or numbness.  Skin: Denies Rashes.  *Review of Systems is negative unless otherwise mentioned in HPI or ROS above.    Objective   /68   Pulse 78   Temp 36.4 °C (97.5 °F)   Ht 1.549 m (5' 1\")   Wt 80.7 kg (178 lb)   SpO2 100%   BMI 33.63 kg/m²  reviewed Body mass index is 33.63 kg/m².     Physical Exam  Constitutional: NAD.  Resting comfortably.  Head: Atraumatic, normocephalic.  ENT: Moist oral mucosa. Nasal mucosa wnl.   Cardiac: Regular rate & rhythm.   Pulmonary: Lungs clear bilat  GI: Soft, Nontender, nondistended.   Musculoskeletal: No peripheral edema.   Skin: No evidence of trauma. No rashes  Psych: Intact judgement and insight.    .Assessment/Plan   Problem List Items Addressed This Visit             ICD-10-CM    Anxiety F41.9    Relevant Medications    escitalopram (Lexapro) 10 mg tablet     Other Visit " Diagnoses         Codes    Routine general medical examination at a health care facility    -  Primary Z00.00    Overactive bladder     N32.81    Relevant Medications    oxybutynin (Ditropan) 5 mg tablet

## 2025-03-25 ENCOUNTER — APPOINTMENT (OUTPATIENT)
Dept: PRIMARY CARE | Facility: CLINIC | Age: 35
End: 2025-03-25
Payer: COMMERCIAL

## 2025-03-25 VITALS
OXYGEN SATURATION: 100 % | BODY MASS INDEX: 33.61 KG/M2 | TEMPERATURE: 97.5 F | DIASTOLIC BLOOD PRESSURE: 68 MMHG | WEIGHT: 178 LBS | HEIGHT: 61 IN | HEART RATE: 78 BPM | SYSTOLIC BLOOD PRESSURE: 126 MMHG

## 2025-03-25 DIAGNOSIS — Z00.00 ROUTINE GENERAL MEDICAL EXAMINATION AT A HEALTH CARE FACILITY: Primary | ICD-10-CM

## 2025-03-25 DIAGNOSIS — F41.9 ANXIETY: ICD-10-CM

## 2025-03-25 DIAGNOSIS — N32.81 OVERACTIVE BLADDER: ICD-10-CM

## 2025-03-25 PROCEDURE — 3008F BODY MASS INDEX DOCD: CPT | Performed by: PHYSICIAN ASSISTANT

## 2025-03-25 PROCEDURE — 99395 PREV VISIT EST AGE 18-39: CPT | Performed by: PHYSICIAN ASSISTANT

## 2025-03-25 RX ORDER — ESCITALOPRAM OXALATE 10 MG/1
10 TABLET ORAL DAILY
Qty: 90 TABLET | Refills: 3 | Status: SHIPPED | OUTPATIENT
Start: 2025-03-25

## 2025-03-25 RX ORDER — OXYBUTYNIN CHLORIDE 5 MG/1
5 TABLET ORAL DAILY
Qty: 30 TABLET | Refills: 0 | Status: SHIPPED | OUTPATIENT
Start: 2025-03-25

## 2025-03-25 ASSESSMENT — PATIENT HEALTH QUESTIONNAIRE - PHQ9
2. FEELING DOWN, DEPRESSED OR HOPELESS: NOT AT ALL
SUM OF ALL RESPONSES TO PHQ9 QUESTIONS 1 AND 2: 0
1. LITTLE INTEREST OR PLEASURE IN DOING THINGS: NOT AT ALL

## 2025-06-18 ENCOUNTER — TELEMEDICINE (OUTPATIENT)
Dept: PRIMARY CARE | Facility: CLINIC | Age: 35
End: 2025-06-18
Payer: COMMERCIAL

## 2025-06-18 DIAGNOSIS — F41.9 ANXIETY: Primary | ICD-10-CM

## 2025-06-18 DIAGNOSIS — M51.360 DEGENERATION OF INTERVERTEBRAL DISC OF LUMBAR REGION WITH DISCOGENIC BACK PAIN: ICD-10-CM

## 2025-06-18 PROCEDURE — 99213 OFFICE O/P EST LOW 20 MIN: CPT | Performed by: PHYSICIAN ASSISTANT

## 2025-06-18 RX ORDER — IBUPROFEN 600 MG/1
600 TABLET, FILM COATED ORAL EVERY 8 HOURS PRN
Qty: 30 TABLET | Refills: 3 | Status: SHIPPED | OUTPATIENT
Start: 2025-06-18

## 2025-06-18 RX ORDER — ESCITALOPRAM OXALATE 20 MG/1
20 TABLET ORAL DAILY
Qty: 90 TABLET | Refills: 2 | Status: SHIPPED | OUTPATIENT
Start: 2025-06-18

## 2025-06-18 NOTE — PROGRESS NOTES
An interactive audio and video telecommunication system which permits real time communications between the patient (at the originating site) and provider (at the distant site) was utilized to provide this telehealth service.   Verbal consent was requested and obtained from Ethel Cueva on this date, 06/18/25 for a telehealth visit and the patient's location was confirmed at the time of the visit.    Subjective    Ethel Cueva is a 35 y.o. year old female patient presenting for virtual visit   Chief Complaint   Patient presents with    Anxiety        Anxiety  -effexor caused fatigue and URI symptoms. Has been off for months. Zoloft as a young teen  Lexapro 10mg, doing well but need increased dose.      Bladder issues. Urgency for years.   Recommend pelvic floor therapy  Started oxybutynin     Migraines   Doing well without meds  -ok with ibuprofen     Low back pain with intermittent radicuopathy  Ortho- Priya Oliveira PA-C, no longer seeing ortho  -meloxicam 7.5mg every day- did not help  -chiropractor & PT  On gabapentin in the past but not currently       Problem List[1]    Medical History[2]   Surgical History[3]   Family History[4]   Social History     Tobacco Use    Smoking status: Every Day     Current packs/day: 0.50     Average packs/day: 0.5 packs/day for 15.8 years (7.9 ttl pk-yrs)     Types: Cigarettes     Start date: 9/17/2009    Smokeless tobacco: Never   Substance Use Topics    Alcohol use: Yes     Comment: social      Current Medications[5]     Review of Systems    Review of Systems:   Constitutional: Denies fever  HEENT: Denies ST, earache  CVS: Denies Chest pain  Pulmonary: Denies wheezing, SOB  GI: Denies N/V  : Denies dysuria  Musculoskeletal:  Denies myalgia  Neuro: Denies focal weakness or numbness.  Skin: Denies Rashes.  *Review of Systems is negative unless otherwise mentioned in HPI or ROS above.     Objective    VITALS  Pt does not have vitals available at time of visit.    Exam       Limited  physical exam in virtual platform  Nontoxic. No acute distress.  Nonlabored breathing.    Assessment/Plan      Problem List Items Addressed This Visit       Anxiety - Primary    Relevant Medications    escitalopram (Lexapro) 20 mg tablet    Degenerative disc disease, lumbar    Relevant Medications    ibuprofen 600 mg tablet                 [1]   Patient Active Problem List  Diagnosis    Anxiety    Lattice degeneration of retina    Intractable migraine without status migrainosus    Degenerative disc disease, lumbar   [2] History reviewed. No pertinent past medical history.  [3]   Past Surgical History:  Procedure Laterality Date    MR ATHROGRAM SHOULDER LEFT W FL GUIDED INJECTION Left 1/7/2016    MR SHOULDER ARTHROGRAM LEFT W FL GUIDED INJECTION [facility]    OTHER SURGICAL HISTORY  06/01/2020    Tonsillectomy    OTHER SURGICAL HISTORY  06/01/2020    Loop electrosurgical excision procedure   [4]   Family History  Problem Relation Name Age of Onset    Arthritis Mother      Fibromyalgia Mother      Diabetes Father          father was adopted    Kidney disease Father      Stroke Father      Breast cancer Maternal Grandmother      Lung cancer Maternal Grandmother      Lung cancer Maternal Grandfather     [5]   Current Outpatient Medications:     escitalopram (Lexapro) 20 mg tablet, Take 1 tablet (20 mg) by mouth once daily., Disp: 90 tablet, Rfl: 2    ibuprofen 600 mg tablet, Take 1 tablet (600 mg) by mouth every 8 hours if needed for mild pain (1 - 3) (pain)., Disp: 30 tablet, Rfl: 3    oxybutynin (Ditropan) 5 mg tablet, Take 1 tablet (5 mg) by mouth once daily., Disp: 30 tablet, Rfl: 0

## 2025-09-29 ENCOUNTER — APPOINTMENT (OUTPATIENT)
Dept: PRIMARY CARE | Facility: CLINIC | Age: 35
End: 2025-09-29
Payer: COMMERCIAL